# Patient Record
Sex: MALE | Race: WHITE
[De-identification: names, ages, dates, MRNs, and addresses within clinical notes are randomized per-mention and may not be internally consistent; named-entity substitution may affect disease eponyms.]

---

## 2018-02-26 ENCOUNTER — TRANSCRIPTION ENCOUNTER (OUTPATIENT)
Age: 48
End: 2018-02-26

## 2021-08-24 ENCOUNTER — TRANSCRIPTION ENCOUNTER (OUTPATIENT)
Age: 51
End: 2021-08-24

## 2021-12-19 ENCOUNTER — TRANSCRIPTION ENCOUNTER (OUTPATIENT)
Age: 51
End: 2021-12-19

## 2022-03-31 ENCOUNTER — APPOINTMENT (OUTPATIENT)
Dept: ORTHOPEDIC SURGERY | Facility: CLINIC | Age: 52
End: 2022-03-31
Payer: COMMERCIAL

## 2022-03-31 ENCOUNTER — NON-APPOINTMENT (OUTPATIENT)
Age: 52
End: 2022-03-31

## 2022-03-31 VITALS — WEIGHT: 185 LBS | HEIGHT: 69 IN | BODY MASS INDEX: 27.4 KG/M2

## 2022-03-31 DIAGNOSIS — M70.62 TROCHANTERIC BURSITIS, LEFT HIP: ICD-10-CM

## 2022-03-31 DIAGNOSIS — M25.552 PAIN IN LEFT HIP: ICD-10-CM

## 2022-03-31 DIAGNOSIS — M85.652: ICD-10-CM

## 2022-03-31 PROCEDURE — 73502 X-RAY EXAM HIP UNI 2-3 VIEWS: CPT | Mod: LT

## 2022-03-31 PROCEDURE — 99204 OFFICE O/P NEW MOD 45 MIN: CPT

## 2022-03-31 NOTE — ADDENDUM
[FreeTextEntry1] : I, Primitivo Navarrete, acted solely as a scribe for Dr. Ruel Sofia on this date 03/31/2022.\par All medical record entries made by the Scribe were at my, Dr. Ruel Sofia, direction and personally dictated by me on 03/31/2022. I have reviewed the chart and agree that the record accurately reflects my personal performance of the history, physical exam, assessment and plan. I have also personally directed, reviewed, and agreed with the chart.

## 2022-03-31 NOTE — DISCUSSION/SUMMARY
[de-identified] : I discussed the underlying pathophysiology of the patient's condition in great detail with the patient. I went over the patient's x-rays with them in great detail. I advised him that he has no arthritis in either hip, but he does have a benign cyst in the left hip. For now, he needs to avoid high-impact activities such as running and jumping. I recommend alternative activities such as biking and lower body exercises seated on a ball. He should focus on light weight and high repetition exercises. He can use a cane in the opposite hand to unload his hip. We discussed the use of ice, Tylenol and anti-inflammatories to relieve pain. At this time, he will start a course of physical therapy for strengthening and flexibility. A prescription was provided. If his hip is strong but he still has pain we will order an MRI of his hip. All of his questions were answered. He understands and consents to the plan.\par \par FU 1 month.\par after undergoing PT for his left hip.

## 2022-03-31 NOTE — HISTORY OF PRESENT ILLNESS
[de-identified] : 51 year male presents for evaluation of left lateral hip pain since earlier today (3/31). He denies any injury or trauma. He thought he pulled a muscle. The pain has worsened throughout the day. Pain is localized. Denies low back pain or radiation of pain down the leg. He took Tylenol but did not have any relief. He states that he is typically pretty active and exercises, but has not done so recently. \par Pmhx of gout and high cholesterol.

## 2022-03-31 NOTE — PHYSICAL EXAM
[de-identified] : Constitutional\par o Appearance : well-nourished, well developed, alert, in no acute distress \par Head and Face\par o Head :\par ¦ Inspection : atraumatic, normocephalic\par o Face :\par ¦ Inspection : no visible rash or discoloration\par Respiratory\par o Respiratory Effort: breathing unlabored \par Neurologic\par o Mental Status Examination :\par ¦ Orientation : alert and oriented X 3\par Psychiatric\par o Mood and Affect: mood normal, affect appropriate\par Cardiovascular\par o Observation/Palpation : - no swelling\par Lymphatic\par o Additional Nodes : No palpable lymph nodes present\par \par Lumbosacral Spine\par o Inspection : no visible rash or discoloration\par o Palpation : no paraspinal musculature tenderness\par o Range of Motion : arc of motion full in all planes, no crepitance or pain with ROM\par o Muscle Strength : paraspinal muscle strength and tone within normal limits\par o Muscle Tone : paraspinal muscle strength and tone within normal limits\par o Tests: straight leg test negative and JACOB test negative bilaterally\par  \par Right Lower Extremity\par o Buttock : no tenderness, swelling or deformities\par o Right Hip :\par ¦ Inspection/Palpation : no tenderness, no swelling or deformities\par ¦ Range of Motion : full and painless in all planes, no crepitance\par ¦ Stability : joint stability intact\par ¦ Strength : extension, flexion 5/5, adduction, abduction, internal rotation and external rotation\par ¦ Tests: Erin’s test negative\par \par Left Lower Extremity\par o Buttock : no tenderness, swelling or deformities \par o Left Hip :\par ¦ Inspection/Palpation : tenderness over the abductor muscles and greater trochanter, no swelling\par ¦ Range of Motion : full flexion, mild discomfort with full external rotation, no crepitance\par ¦ Stability : joint stability intact\par ¦ Strength : extension, flexion 3+/5 limited by pain, adduction, abduction 3+/5 limited by pain, internal rotation and external rotation\par ¦ Tests: Erin’s test negative\par \par Gait: abductor lurch on the left, antalgic gait, no significant extremity swelling or lymphedema, good proprioception and balance\par \par Radiology Results \par o Pelvis and Left Hip : AP pelvis and lateral views were obtained and reveal benign cystic change of the lateral femoral neck. No significant degenerative arthritis of either hip.

## 2022-03-31 NOTE — CONSULT LETTER
[email or web address]. ./[email or web address]   [Dear  ___] : Dear  [unfilled], [Consult Letter:] : I had the pleasure of evaluating your patient, [unfilled]. [Please see my note below.] : Please see my note below. [Consult Closing:] : Thank you very much for allowing me to participate in the care of this patient.  If you have any questions, please do not hesitate to contact me. [Sincerely,] : Sincerely, [FreeTextEntry2] : Dr. Brock Joel [FreeTextEntry3] : Ruel Sofia M.D.

## 2022-06-29 ENCOUNTER — NON-APPOINTMENT (OUTPATIENT)
Age: 52
End: 2022-06-29

## 2022-09-22 ENCOUNTER — APPOINTMENT (OUTPATIENT)
Dept: ORTHOPEDIC SURGERY | Facility: CLINIC | Age: 52
End: 2022-09-22

## 2023-11-21 ENCOUNTER — APPOINTMENT (OUTPATIENT)
Dept: ORTHOPEDIC SURGERY | Facility: CLINIC | Age: 53
End: 2023-11-21
Payer: COMMERCIAL

## 2023-11-21 VITALS — BODY MASS INDEX: 32.58 KG/M2 | HEIGHT: 69 IN | WEIGHT: 220 LBS

## 2023-11-21 DIAGNOSIS — Z86.79 PERSONAL HISTORY OF OTHER DISEASES OF THE CIRCULATORY SYSTEM: ICD-10-CM

## 2023-11-21 DIAGNOSIS — M54.50 LOW BACK PAIN, UNSPECIFIED: ICD-10-CM

## 2023-11-21 DIAGNOSIS — Z87.39 PERSONAL HISTORY OF OTHER DISEASES OF THE MUSCULOSKELETAL SYSTEM AND CONNECTIVE TISSUE: ICD-10-CM

## 2023-11-21 DIAGNOSIS — Z87.891 PERSONAL HISTORY OF NICOTINE DEPENDENCE: ICD-10-CM

## 2023-11-21 DIAGNOSIS — M87.9 OSTEONECROSIS, UNSPECIFIED: ICD-10-CM

## 2023-11-21 DIAGNOSIS — Z86.39 PERSONAL HISTORY OF OTHER ENDOCRINE, NUTRITIONAL AND METABOLIC DISEASE: ICD-10-CM

## 2023-11-21 PROCEDURE — 99204 OFFICE O/P NEW MOD 45 MIN: CPT

## 2023-11-21 PROCEDURE — 72190 X-RAY EXAM OF PELVIS: CPT

## 2023-11-21 PROCEDURE — 72081 X-RAY EXAM ENTIRE SPI 1 VW: CPT

## 2023-11-22 RX ORDER — IBUPROFEN 800 MG/1
800 TABLET, FILM COATED ORAL
Qty: 90 | Refills: 0 | Status: ACTIVE | COMMUNITY
Start: 2023-11-22 | End: 1900-01-01

## 2023-11-27 ENCOUNTER — OUTPATIENT (OUTPATIENT)
Dept: OUTPATIENT SERVICES | Facility: HOSPITAL | Age: 53
LOS: 1 days | End: 2023-11-27
Payer: COMMERCIAL

## 2023-11-27 ENCOUNTER — APPOINTMENT (OUTPATIENT)
Dept: MRI IMAGING | Facility: HOSPITAL | Age: 53
End: 2023-11-27

## 2023-11-27 ENCOUNTER — RESULT REVIEW (OUTPATIENT)
Age: 53
End: 2023-11-27

## 2023-11-27 DIAGNOSIS — Z00.8 ENCOUNTER FOR OTHER GENERAL EXAMINATION: ICD-10-CM

## 2023-11-27 PROCEDURE — 72195 MRI PELVIS W/O DYE: CPT

## 2023-11-27 PROCEDURE — 72195 MRI PELVIS W/O DYE: CPT | Mod: 26

## 2023-12-06 PROBLEM — M87.9 OSTEONECROSIS OF BOTH HIPS: Status: ACTIVE | Noted: 2023-12-06

## 2023-12-08 ENCOUNTER — APPOINTMENT (OUTPATIENT)
Dept: RADIOLOGY | Facility: IMAGING CENTER | Age: 53
End: 2023-12-08
Payer: COMMERCIAL

## 2023-12-08 ENCOUNTER — OUTPATIENT (OUTPATIENT)
Dept: OUTPATIENT SERVICES | Facility: HOSPITAL | Age: 53
LOS: 1 days | End: 2023-12-08
Payer: COMMERCIAL

## 2023-12-08 ENCOUNTER — RESULT REVIEW (OUTPATIENT)
Age: 53
End: 2023-12-08

## 2023-12-08 DIAGNOSIS — Z00.8 ENCOUNTER FOR OTHER GENERAL EXAMINATION: ICD-10-CM

## 2023-12-08 PROCEDURE — 77080 DXA BONE DENSITY AXIAL: CPT

## 2023-12-08 PROCEDURE — 77080 DXA BONE DENSITY AXIAL: CPT | Mod: 26
